# Patient Record
Sex: FEMALE | Race: ASIAN | Employment: PART TIME | URBAN - METROPOLITAN AREA
[De-identification: names, ages, dates, MRNs, and addresses within clinical notes are randomized per-mention and may not be internally consistent; named-entity substitution may affect disease eponyms.]

---

## 2017-12-25 ENCOUNTER — APPOINTMENT (OUTPATIENT)
Dept: CT IMAGING | Age: 27
DRG: 253 | End: 2017-12-25
Payer: COMMERCIAL

## 2017-12-25 ENCOUNTER — HOSPITAL ENCOUNTER (INPATIENT)
Age: 27
LOS: 2 days | Discharge: HOME | DRG: 253 | End: 2017-12-27
Attending: EMERGENCY MEDICINE | Admitting: INTERNAL MEDICINE
Payer: COMMERCIAL

## 2017-12-25 ENCOUNTER — APPOINTMENT (OUTPATIENT)
Dept: GENERAL RADIOLOGY | Age: 27
DRG: 253 | End: 2017-12-25
Payer: COMMERCIAL

## 2017-12-25 ENCOUNTER — HOSPITAL ENCOUNTER (EMERGENCY)
Age: 27
Discharge: HOME | DRG: 253 | End: 2017-12-25
Payer: COMMERCIAL

## 2017-12-25 VITALS
TEMPERATURE: 98 F | BODY MASS INDEX: 16.53 KG/M2 | SYSTOLIC BLOOD PRESSURE: 116 MMHG | RESPIRATION RATE: 20 BRPM | HEIGHT: 59 IN | WEIGHT: 82 LBS | HEART RATE: 101 BPM | DIASTOLIC BLOOD PRESSURE: 98 MMHG | OXYGEN SATURATION: 100 %

## 2017-12-25 DIAGNOSIS — K92.1 GASTROINTESTINAL HEMORRHAGE WITH MELENA: Primary | ICD-10-CM

## 2017-12-25 DIAGNOSIS — K29.01 ACUTE SUPERFICIAL GASTRITIS WITH HEMORRHAGE: Primary | ICD-10-CM

## 2017-12-25 DIAGNOSIS — S09.90XA CLOSED HEAD INJURY, INITIAL ENCOUNTER: ICD-10-CM

## 2017-12-25 DIAGNOSIS — R55 SYNCOPE AND COLLAPSE: ICD-10-CM

## 2017-12-25 PROBLEM — K92.2 UPPER GI BLEED: Status: ACTIVE | Noted: 2017-12-25

## 2017-12-25 LAB
ABO/RH: NORMAL
ALBUMIN SERPL-MCNC: 3.6 G/DL (ref 3.9–4.9)
ALBUMIN SERPL-MCNC: 3.9 G/DL (ref 3.9–4.9)
ALP BLD-CCNC: 34 U/L (ref 40–130)
ALP BLD-CCNC: 36 U/L (ref 40–130)
ALT SERPL-CCNC: 16 U/L (ref 0–33)
ALT SERPL-CCNC: 17 U/L (ref 0–33)
ANION GAP SERPL CALCULATED.3IONS-SCNC: 12 MEQ/L (ref 7–13)
ANION GAP SERPL CALCULATED.3IONS-SCNC: 12 MEQ/L (ref 7–13)
ANTIBODY SCREEN: NORMAL
AST SERPL-CCNC: 21 U/L (ref 0–35)
AST SERPL-CCNC: 26 U/L (ref 0–35)
BACTERIA: ABNORMAL /HPF
BASOPHILS ABSOLUTE: 0 K/UL (ref 0–0.2)
BASOPHILS ABSOLUTE: 0.1 K/UL (ref 0–0.2)
BASOPHILS ABSOLUTE: 0.1 K/UL (ref 0–0.2)
BASOPHILS RELATIVE PERCENT: 0.5 %
BASOPHILS RELATIVE PERCENT: 0.6 %
BASOPHILS RELATIVE PERCENT: 1.1 %
BILIRUB SERPL-MCNC: 0.4 MG/DL (ref 0–1.2)
BILIRUB SERPL-MCNC: 0.5 MG/DL (ref 0–1.2)
BILIRUBIN URINE: NEGATIVE
BLOOD, URINE: NEGATIVE
BUN BLDV-MCNC: 21 MG/DL (ref 6–20)
BUN BLDV-MCNC: 29 MG/DL (ref 6–20)
CALCIUM SERPL-MCNC: 7.6 MG/DL (ref 8.6–10.2)
CALCIUM SERPL-MCNC: 7.8 MG/DL (ref 8.6–10.2)
CHLORIDE BLD-SCNC: 104 MEQ/L (ref 98–107)
CHLORIDE BLD-SCNC: 106 MEQ/L (ref 98–107)
CHP ED QC CHECK: NORMAL
CLARITY: CLEAR
CO2: 20 MEQ/L (ref 22–29)
CO2: 21 MEQ/L (ref 22–29)
COLOR: YELLOW
CREAT SERPL-MCNC: 0.39 MG/DL (ref 0.5–0.9)
CREAT SERPL-MCNC: 0.42 MG/DL (ref 0.5–0.9)
EKG ATRIAL RATE: 71 BPM
EKG P AXIS: 76 DEGREES
EKG P-R INTERVAL: 158 MS
EKG Q-T INTERVAL: 400 MS
EKG QRS DURATION: 78 MS
EKG QTC CALCULATION (BAZETT): 434 MS
EKG R AXIS: 15 DEGREES
EKG T AXIS: 10 DEGREES
EKG VENTRICULAR RATE: 71 BPM
EOSINOPHILS ABSOLUTE: 0 K/UL (ref 0–0.7)
EOSINOPHILS ABSOLUTE: 0.1 K/UL (ref 0–0.7)
EOSINOPHILS ABSOLUTE: 0.3 K/UL (ref 0–0.7)
EOSINOPHILS RELATIVE PERCENT: 0.1 %
EOSINOPHILS RELATIVE PERCENT: 0.7 %
EOSINOPHILS RELATIVE PERCENT: 2.3 %
EPITHELIAL CELLS, UA: ABNORMAL /HPF
ETHANOL PERCENT: NORMAL G/DL
ETHANOL: <10 MG/DL (ref 0–0.08)
GFR AFRICAN AMERICAN: >60
GFR AFRICAN AMERICAN: >60
GFR NON-AFRICAN AMERICAN: >60
GFR NON-AFRICAN AMERICAN: >60
GLOBULIN: 2.2 G/DL (ref 2.3–3.5)
GLOBULIN: 2.3 G/DL (ref 2.3–3.5)
GLUCOSE BLD-MCNC: 123 MG/DL (ref 74–109)
GLUCOSE BLD-MCNC: 127 MG/DL (ref 74–109)
GLUCOSE URINE: NEGATIVE MG/DL
HCT VFR BLD CALC: 24 % (ref 37–47)
HCT VFR BLD CALC: 27.7 % (ref 37–47)
HCT VFR BLD CALC: 28.9 % (ref 37–47)
HEMOGLOBIN: 8 G/DL (ref 12–16)
HEMOGLOBIN: 9 G/DL (ref 12–16)
HEMOGLOBIN: 9.6 G/DL (ref 12–16)
KETONES, URINE: ABNORMAL MG/DL
LEUKOCYTE ESTERASE, URINE: ABNORMAL
LIPASE: 32 U/L (ref 13–60)
LYMPHOCYTES ABSOLUTE: 1.4 K/UL (ref 1–4.8)
LYMPHOCYTES ABSOLUTE: 1.6 K/UL (ref 1–4.8)
LYMPHOCYTES ABSOLUTE: 2.7 K/UL (ref 1–4.8)
LYMPHOCYTES RELATIVE PERCENT: 10.4 %
LYMPHOCYTES RELATIVE PERCENT: 17.3 %
LYMPHOCYTES RELATIVE PERCENT: 23.3 %
MCH RBC QN AUTO: 25.6 PG (ref 27–31.3)
MCH RBC QN AUTO: 26.2 PG (ref 27–31.3)
MCH RBC QN AUTO: 26.5 PG (ref 27–31.3)
MCHC RBC AUTO-ENTMCNC: 32.5 % (ref 33–37)
MCHC RBC AUTO-ENTMCNC: 33.2 % (ref 33–37)
MCHC RBC AUTO-ENTMCNC: 33.4 % (ref 33–37)
MCV RBC AUTO: 78.7 FL (ref 82–100)
MCV RBC AUTO: 78.8 FL (ref 82–100)
MCV RBC AUTO: 79.4 FL (ref 82–100)
MONOCYTES ABSOLUTE: 0.4 K/UL (ref 0.2–0.8)
MONOCYTES ABSOLUTE: 0.6 K/UL (ref 0.2–0.8)
MONOCYTES ABSOLUTE: 1 K/UL (ref 0.2–0.8)
MONOCYTES RELATIVE PERCENT: 2.8 %
MONOCYTES RELATIVE PERCENT: 7.3 %
MONOCYTES RELATIVE PERCENT: 8.4 %
NEUTROPHILS ABSOLUTE: 12.9 K/UL (ref 1.4–6.5)
NEUTROPHILS ABSOLUTE: 6 K/UL (ref 1.4–6.5)
NEUTROPHILS ABSOLUTE: 7.6 K/UL (ref 1.4–6.5)
NEUTROPHILS RELATIVE PERCENT: 64.9 %
NEUTROPHILS RELATIVE PERCENT: 74.1 %
NEUTROPHILS RELATIVE PERCENT: 86.2 %
NITRITE, URINE: NEGATIVE
PDW BLD-RTO: 13 % (ref 11.5–14.5)
PDW BLD-RTO: 13.3 % (ref 11.5–14.5)
PDW BLD-RTO: 13.3 % (ref 11.5–14.5)
PH UA: 7.5 (ref 5–9)
PLATELET # BLD: 150 K/UL (ref 130–400)
PLATELET # BLD: 184 K/UL (ref 130–400)
PLATELET # BLD: 202 K/UL (ref 130–400)
POC CREATININE WHOLE BLOOD: 1.1
POTASSIUM SERPL-SCNC: 3.6 MEQ/L (ref 3.5–5.1)
POTASSIUM SERPL-SCNC: 3.8 MEQ/L (ref 3.5–5.1)
PREGNANCY TEST URINE, POC: NEGATIVE
PROTEIN UA: 30 MG/DL
RBC # BLD: 3.02 M/UL (ref 4.2–5.4)
RBC # BLD: 3.51 M/UL (ref 4.2–5.4)
RBC # BLD: 3.67 M/UL (ref 4.2–5.4)
RBC UA: ABNORMAL /HPF (ref 0–2)
SODIUM BLD-SCNC: 137 MEQ/L (ref 132–144)
SODIUM BLD-SCNC: 138 MEQ/L (ref 132–144)
SPECIFIC GRAVITY UA: 1.02 (ref 1–1.03)
TOTAL PROTEIN: 5.8 G/DL (ref 6.4–8.1)
TOTAL PROTEIN: 6.2 G/DL (ref 6.4–8.1)
TROPONIN: <0.01 NG/ML (ref 0–0.01)
URINE REFLEX TO CULTURE: YES
UROBILINOGEN, URINE: 1 E.U./DL
WBC # BLD: 11.7 K/UL (ref 4.8–10.8)
WBC # BLD: 15 K/UL (ref 4.8–10.8)
WBC # BLD: 8.1 K/UL (ref 4.8–10.8)
WBC UA: ABNORMAL /HPF (ref 0–5)

## 2017-12-25 PROCEDURE — 99285 EMERGENCY DEPT VISIT HI MDM: CPT

## 2017-12-25 PROCEDURE — 99284 EMERGENCY DEPT VISIT MOD MDM: CPT

## 2017-12-25 PROCEDURE — 85025 COMPLETE CBC W/AUTO DIFF WBC: CPT

## 2017-12-25 PROCEDURE — 2580000003 HC RX 258: Performed by: PERSONAL EMERGENCY RESPONSE ATTENDANT

## 2017-12-25 PROCEDURE — 96372 THER/PROPH/DIAG INJ SC/IM: CPT

## 2017-12-25 PROCEDURE — 96374 THER/PROPH/DIAG INJ IV PUSH: CPT

## 2017-12-25 PROCEDURE — 80053 COMPREHEN METABOLIC PANEL: CPT

## 2017-12-25 PROCEDURE — C9113 INJ PANTOPRAZOLE SODIUM, VIA: HCPCS | Performed by: PERSONAL EMERGENCY RESPONSE ATTENDANT

## 2017-12-25 PROCEDURE — 84484 ASSAY OF TROPONIN QUANT: CPT

## 2017-12-25 PROCEDURE — 96361 HYDRATE IV INFUSION ADD-ON: CPT

## 2017-12-25 PROCEDURE — 86920 COMPATIBILITY TEST SPIN: CPT

## 2017-12-25 PROCEDURE — 96375 TX/PRO/DX INJ NEW DRUG ADDON: CPT

## 2017-12-25 PROCEDURE — 2580000003 HC RX 258: Performed by: EMERGENCY MEDICINE

## 2017-12-25 PROCEDURE — C9113 INJ PANTOPRAZOLE SODIUM, VIA: HCPCS | Performed by: INTERNAL MEDICINE

## 2017-12-25 PROCEDURE — 86900 BLOOD TYPING SEROLOGIC ABO: CPT

## 2017-12-25 PROCEDURE — 6360000002 HC RX W HCPCS: Performed by: INTERNAL MEDICINE

## 2017-12-25 PROCEDURE — 83690 ASSAY OF LIPASE: CPT

## 2017-12-25 PROCEDURE — G0480 DRUG TEST DEF 1-7 CLASSES: HCPCS

## 2017-12-25 PROCEDURE — 6360000004 HC RX CONTRAST MEDICATION: Performed by: PERSONAL EMERGENCY RESPONSE ATTENDANT

## 2017-12-25 PROCEDURE — 2580000003 HC RX 258: Performed by: INTERNAL MEDICINE

## 2017-12-25 PROCEDURE — 74177 CT ABD & PELVIS W/CONTRAST: CPT

## 2017-12-25 PROCEDURE — 6360000002 HC RX W HCPCS: Performed by: EMERGENCY MEDICINE

## 2017-12-25 PROCEDURE — 87086 URINE CULTURE/COLONY COUNT: CPT

## 2017-12-25 PROCEDURE — 93005 ELECTROCARDIOGRAM TRACING: CPT

## 2017-12-25 PROCEDURE — 36415 COLL VENOUS BLD VENIPUNCTURE: CPT

## 2017-12-25 PROCEDURE — 2000000000 HC ICU R&B

## 2017-12-25 PROCEDURE — 86850 RBC ANTIBODY SCREEN: CPT

## 2017-12-25 PROCEDURE — 86901 BLOOD TYPING SEROLOGIC RH(D): CPT

## 2017-12-25 PROCEDURE — 70450 CT HEAD/BRAIN W/O DYE: CPT

## 2017-12-25 PROCEDURE — 2580000003 HC RX 258: Performed by: PHYSICIAN ASSISTANT

## 2017-12-25 PROCEDURE — 71010 XR CHEST PORTABLE: CPT

## 2017-12-25 PROCEDURE — 12001 RPR S/N/AX/GEN/TRNK 2.5CM/<: CPT

## 2017-12-25 PROCEDURE — C9113 INJ PANTOPRAZOLE SODIUM, VIA: HCPCS | Performed by: EMERGENCY MEDICINE

## 2017-12-25 PROCEDURE — 6360000002 HC RX W HCPCS: Performed by: PERSONAL EMERGENCY RESPONSE ATTENDANT

## 2017-12-25 PROCEDURE — 81001 URINALYSIS AUTO W/SCOPE: CPT

## 2017-12-25 RX ORDER — FERROUS SULFATE 325(65) MG
325 TABLET ORAL
Qty: 30 TABLET | Refills: 0 | Status: SHIPPED | OUTPATIENT
Start: 2017-12-25

## 2017-12-25 RX ORDER — PROMETHAZINE HYDROCHLORIDE 25 MG/ML
25 INJECTION, SOLUTION INTRAMUSCULAR; INTRAVENOUS ONCE
Status: COMPLETED | OUTPATIENT
Start: 2017-12-25 | End: 2017-12-25

## 2017-12-25 RX ORDER — 0.9 % SODIUM CHLORIDE 0.9 %
10 VIAL (ML) INJECTION EVERY 12 HOURS SCHEDULED
Status: DISCONTINUED | OUTPATIENT
Start: 2017-12-25 | End: 2017-12-28 | Stop reason: HOSPADM

## 2017-12-25 RX ORDER — SODIUM CHLORIDE 9 MG/ML
INJECTION, SOLUTION INTRAVENOUS CONTINUOUS
Status: DISCONTINUED | OUTPATIENT
Start: 2017-12-25 | End: 2017-12-28 | Stop reason: HOSPADM

## 2017-12-25 RX ORDER — 0.9 % SODIUM CHLORIDE 0.9 %
1000 INTRAVENOUS SOLUTION INTRAVENOUS ONCE
Status: COMPLETED | OUTPATIENT
Start: 2017-12-25 | End: 2017-12-25

## 2017-12-25 RX ORDER — PANTOPRAZOLE SODIUM 40 MG/10ML
40 INJECTION, POWDER, LYOPHILIZED, FOR SOLUTION INTRAVENOUS ONCE
Status: COMPLETED | OUTPATIENT
Start: 2017-12-25 | End: 2017-12-25

## 2017-12-25 RX ORDER — SODIUM CHLORIDE 0.9 % (FLUSH) 0.9 %
10 SYRINGE (ML) INJECTION EVERY 12 HOURS SCHEDULED
Status: DISCONTINUED | OUTPATIENT
Start: 2017-12-25 | End: 2017-12-28 | Stop reason: HOSPADM

## 2017-12-25 RX ORDER — SODIUM CHLORIDE 9 MG/ML
INJECTION, SOLUTION INTRAVENOUS CONTINUOUS
Status: DISCONTINUED | OUTPATIENT
Start: 2017-12-25 | End: 2017-12-26

## 2017-12-25 RX ORDER — SODIUM CHLORIDE 0.9 % (FLUSH) 0.9 %
10 SYRINGE (ML) INJECTION PRN
Status: DISCONTINUED | OUTPATIENT
Start: 2017-12-25 | End: 2017-12-28 | Stop reason: HOSPADM

## 2017-12-25 RX ORDER — ACETAMINOPHEN 325 MG/1
650 TABLET ORAL EVERY 4 HOURS PRN
Status: DISCONTINUED | OUTPATIENT
Start: 2017-12-25 | End: 2017-12-28 | Stop reason: HOSPADM

## 2017-12-25 RX ORDER — ONDANSETRON 2 MG/ML
4 INJECTION INTRAMUSCULAR; INTRAVENOUS ONCE
Status: COMPLETED | OUTPATIENT
Start: 2017-12-25 | End: 2017-12-25

## 2017-12-25 RX ORDER — ONDANSETRON 4 MG/1
4 TABLET, ORALLY DISINTEGRATING ORAL EVERY 8 HOURS PRN
Qty: 20 TABLET | Refills: 0 | Status: SHIPPED | OUTPATIENT
Start: 2017-12-25 | End: 2018-01-08 | Stop reason: ALTCHOICE

## 2017-12-25 RX ORDER — 0.9 % SODIUM CHLORIDE 0.9 %
10 VIAL (ML) INJECTION PRN
Status: DISCONTINUED | OUTPATIENT
Start: 2017-12-25 | End: 2017-12-28 | Stop reason: HOSPADM

## 2017-12-25 RX ORDER — FAMOTIDINE 20 MG/1
20 TABLET, FILM COATED ORAL 2 TIMES DAILY
Qty: 60 TABLET | Refills: 0 | Status: ON HOLD | OUTPATIENT
Start: 2017-12-25 | End: 2017-12-28 | Stop reason: HOSPADM

## 2017-12-25 RX ORDER — 0.9 % SODIUM CHLORIDE 0.9 %
10 VIAL (ML) INJECTION DAILY
Status: DISCONTINUED | OUTPATIENT
Start: 2017-12-25 | End: 2017-12-25 | Stop reason: HOSPADM

## 2017-12-25 RX ORDER — ONDANSETRON 2 MG/ML
4 INJECTION INTRAMUSCULAR; INTRAVENOUS EVERY 6 HOURS PRN
Status: DISCONTINUED | OUTPATIENT
Start: 2017-12-25 | End: 2017-12-28 | Stop reason: HOSPADM

## 2017-12-25 RX ADMIN — Medication 10 ML: at 11:09

## 2017-12-25 RX ADMIN — PROMETHAZINE HYDROCHLORIDE 25 MG: 25 INJECTION, SOLUTION INTRAMUSCULAR; INTRAVENOUS at 04:42

## 2017-12-25 RX ADMIN — SODIUM CHLORIDE: 9 INJECTION, SOLUTION INTRAVENOUS at 20:32

## 2017-12-25 RX ADMIN — SODIUM CHLORIDE: 9 INJECTION, SOLUTION INTRAVENOUS at 11:08

## 2017-12-25 RX ADMIN — Medication 8 MG/HR: at 16:09

## 2017-12-25 RX ADMIN — IOPAMIDOL 100 ML: 612 INJECTION, SOLUTION INTRAVENOUS at 05:21

## 2017-12-25 RX ADMIN — SODIUM CHLORIDE 1000 ML: 9 INJECTION, SOLUTION INTRAVENOUS at 07:41

## 2017-12-25 RX ADMIN — Medication 8 MG/HR: at 08:00

## 2017-12-25 RX ADMIN — PANTOPRAZOLE SODIUM 40 MG: 40 INJECTION, POWDER, FOR SOLUTION INTRAVENOUS at 04:42

## 2017-12-25 RX ADMIN — SODIUM CHLORIDE 1000 ML: 9 INJECTION, SOLUTION INTRAVENOUS at 04:43

## 2017-12-25 RX ADMIN — ONDANSETRON 4 MG: 2 INJECTION INTRAMUSCULAR; INTRAVENOUS at 07:41

## 2017-12-25 ASSESSMENT — PAIN DESCRIPTION - PROGRESSION

## 2017-12-25 ASSESSMENT — PAIN SCALES - GENERAL
PAINLEVEL_OUTOF10: 0
PAINLEVEL_OUTOF10: 6
PAINLEVEL_OUTOF10: 0

## 2017-12-25 ASSESSMENT — PAIN DESCRIPTION - PAIN TYPE
TYPE: ACUTE PAIN
TYPE: ACUTE PAIN

## 2017-12-25 ASSESSMENT — ENCOUNTER SYMPTOMS
COUGH: 0
VOMITING: 1
EYE PAIN: 0
SHORTNESS OF BREATH: 0
CHEST TIGHTNESS: 0
DIARRHEA: 1
NAUSEA: 1
SHORTNESS OF BREATH: 0
COLOR CHANGE: 0
BLOOD IN STOOL: 1
BLOOD IN STOOL: 1
ABDOMINAL PAIN: 1
NAUSEA: 0
RHINORRHEA: 0
ABDOMINAL PAIN: 1
SORE THROAT: 0
VOMITING: 1

## 2017-12-25 ASSESSMENT — PAIN DESCRIPTION - ORIENTATION: ORIENTATION: POSTERIOR

## 2017-12-25 ASSESSMENT — PAIN SCALES - WONG BAKER
WONGBAKER_NUMERICALRESPONSE: 0
WONGBAKER_NUMERICALRESPONSE: 10

## 2017-12-25 ASSESSMENT — PAIN DESCRIPTION - LOCATION
LOCATION: ABDOMEN
LOCATION: HEAD

## 2017-12-25 NOTE — H&P
daily (with breakfast) 12/25/17   TERRY Soriano   famotidine (PEPCID) 20 MG tablet Take 1 tablet by mouth 2 times daily 12/25/17   TERRY Soriano   ondansetron (ZOFRAN ODT) 4 MG disintegrating tablet Take 1 tablet by mouth every 8 hours as needed for Nausea 12/25/17   TERRY Soriano       ALLERGIES: Review of patient's allergies indicates no known allergies. REVIEW OF SYSTEM:   ROS as noted in HPI, 12 point ROS reviewed and otherwise negative. OBJECTIVE  PHYSICAL EXAM: /79   Pulse 102   Temp 98.6 °F (37 °C) (Oral)   Resp 16   Ht 4' 11\" (1.499 m)   Wt 82 lb (37.2 kg)   LMP 12/10/2017 (Exact Date)   SpO2 100%   BMI 16.56 kg/m²     CONSTITUTIONAL:  alert, no apparent distress and thin  EYES:  pupils equal, round and reactive to light, conjunctiva normal sclera clear  ENT:  normocepalic, without obvious abnormality, atraumatic  NECK:  supple, symmetrical, trachea midline and skin normal  LUNGS:  no increased work of breathing and clear to auscultation  CARDIOVASCULAR:  normal apical pulses and normal S1 and S2  ABDOMEN:  normal bowel sounds and non-tender  MUSCULOSKELETAL:  there is no redness, warmth, or swelling of the joints  full range of motion noted  NEUROLOGIC:  Mental Status Exam:  Level of Alertness:   lethargic  Orientation:   person, place, time  SKIN:  normal skin color, texture, turgor    DATA:     Diagnostic tests reviewed for today's visit:    Most recent labs and imaging results reviewed.      LABS:    Recent Results (from the past 24 hour(s))   Comprehensive Metabolic Panel    Collection Time: 12/25/17  4:15 AM   Result Value Ref Range    Sodium 137 132 - 144 mEq/L    Potassium 3.6 3.5 - 5.1 mEq/L    Chloride 104 98 - 107 mEq/L    CO2 21 (L) 22 - 29 mEq/L    Anion Gap 12 7 - 13 mEq/L    Glucose 123 (H) 74 - 109 mg/dL    BUN 29 (H) 6 - 20 mg/dL    CREATININE 0.42 (L) 0.50 - 0.90 mg/dL    GFR Non-African American >60.0 >60    GFR  >60.0 >60    Calcium 7.8

## 2017-12-25 NOTE — ED PROVIDER NOTES
reviewed. No pertinent past medical history. SURGICAL HISTORY     History reviewed. No pertinent surgical history. CURRENT MEDICATIONS       Previous Medications    FAMOTIDINE (PEPCID) 20 MG TABLET    Take 1 tablet by mouth 2 times daily    FERROUS SULFATE (SAWYER-CURRY) 325 (65 FE) MG TABLET    Take 1 tablet by mouth daily (with breakfast)    ONDANSETRON (ZOFRAN ODT) 4 MG DISINTEGRATING TABLET    Take 1 tablet by mouth every 8 hours as needed for Nausea       ALLERGIES     Review of patient's allergies indicates no known allergies. FAMILY HISTORY     History reviewed. No pertinent family history. SOCIAL HISTORY       Social History     Social History    Marital status: Single     Spouse name: N/A    Number of children: N/A    Years of education: N/A     Social History Main Topics    Smoking status: Never Smoker    Smokeless tobacco: Never Used    Alcohol use No    Drug use: No    Sexual activity: Not Asked     Other Topics Concern    None     Social History Narrative    None       SCREENINGS             PHYSICAL EXAM    (up to 7 for level 4, 8 or more for level 5)     ED Triage Vitals [12/25/17 0732]   BP Temp Temp Source Pulse Resp SpO2 Height Weight   92/67 98.6 °F (37 °C) Oral 92 10 100 % 4' 11\" (1.499 m) 82 lb (37.2 kg)       Physical Exam   Constitutional: She is oriented to person, place, and time. She appears well-developed and well-nourished. She appears distressed. HENT:   Head: Normocephalic. Right Ear: External ear normal.   Left Ear: External ear normal.   Mouth/Throat: No oropharyngeal exudate. 2 cm vertical laceration to the occipital area with active bleeding   Eyes: Conjunctivae are normal. Pupils are equal, round, and reactive to light. Neck: Normal range of motion. Neck supple. No JVD present. No tracheal deviation present. No thyromegaly present. Cardiovascular: Normal rate and normal heart sounds. No murmur heard.   Pulmonary/Chest: Effort normal and breath negative for bleeding but she did have a scalp laceration that required staples. Patient was put on Protonix infusion since she already received Protonix bolus on her earlier visit last night. Patient will be admitted to intensive care. I contacted Dr Nathan Chao from GI to let him know that if she deteriorated, she would need endoscopy. MDM      REASSESSMENT     ED Course          CRITICAL CARE TIME   Total Critical Care time was 45 minutes, excluding separately reportable procedures. There was a high probability of clinically significant/life threatening deterioration in the patient's condition which required my urgent intervention. CONSULTS:  None    PROCEDURES:  Unless otherwise noted below, none     Procedures    FINAL IMPRESSION      1. Gastrointestinal hemorrhage with melena    2. Syncope and collapse    3. Closed head injury, initial encounter          DISPOSITION/PLAN   DISPOSITION  admit to ICU      PATIENT REFERRED TO:  No follow-up provider specified.     DISCHARGE MEDICATIONS:  New Prescriptions    No medications on file          (Please note that portions of this note were completed with a voice recognition program.  Efforts were made to edit the dictations but occasionally words are mis-transcribed.)    Lacey Singletary DO (electronically signed)  Attending Emergency Physician          Lacey Singletary DO  12/25/17 5977

## 2017-12-25 NOTE — ED NOTES
Small amount of blood noted to pillow. Small lac noted to back of head. Cleansed with NS. Dr Santos Neville to bedside to evaluate. Minh frank.       Blaine Parham RN  12/25/17 5721

## 2017-12-25 NOTE — CARE COORDINATION
Pt in ICU after a syncopal episode at home that she could not be aroused from, fell and sustained a LAC on her occiput. Pt had been to the ER earlier in the day w abd pain & dark stools, diagnosed w gastritis, meds prescribed, and dc'd home. I met w pt's BF, whom she lives w, and explained C3 role in pt care. Pt was asleep. He stated she is independent, healthy, and \"eats all the time\", she weights 82#. Awaiting GI assessment. C3 will follow. Dc needs pending outcome.

## 2017-12-26 ENCOUNTER — ANESTHESIA EVENT (OUTPATIENT)
Dept: OPERATING ROOM | Age: 27
DRG: 253 | End: 2017-12-26
Payer: COMMERCIAL

## 2017-12-26 ENCOUNTER — ANESTHESIA (OUTPATIENT)
Dept: OPERATING ROOM | Age: 27
DRG: 253 | End: 2017-12-26
Payer: COMMERCIAL

## 2017-12-26 VITALS
RESPIRATION RATE: 24 BRPM | SYSTOLIC BLOOD PRESSURE: 118 MMHG | DIASTOLIC BLOOD PRESSURE: 75 MMHG | OXYGEN SATURATION: 100 %

## 2017-12-26 LAB
ALBUMIN SERPL-MCNC: 3.5 G/DL (ref 3.9–4.9)
ALP BLD-CCNC: 38 U/L (ref 40–130)
ALT SERPL-CCNC: 16 U/L (ref 0–33)
ANION GAP SERPL CALCULATED.3IONS-SCNC: 18 MEQ/L (ref 7–13)
AST SERPL-CCNC: 30 U/L (ref 0–35)
BILIRUB SERPL-MCNC: 4.8 MG/DL (ref 0–1.2)
BLOOD BANK DISPENSE STATUS: NORMAL
BLOOD BANK DISPENSE STATUS: NORMAL
BLOOD BANK PRODUCT CODE: NORMAL
BLOOD BANK PRODUCT CODE: NORMAL
BPU ID: NORMAL
BPU ID: NORMAL
BUN BLDV-MCNC: 10 MG/DL (ref 6–20)
CALCIUM SERPL-MCNC: 7.9 MG/DL (ref 8.6–10.2)
CHLORIDE BLD-SCNC: 109 MEQ/L (ref 98–107)
CO2: 14 MEQ/L (ref 22–29)
CREAT SERPL-MCNC: 0.24 MG/DL (ref 0.5–0.9)
DESCRIPTION BLOOD BANK: NORMAL
DESCRIPTION BLOOD BANK: NORMAL
GFR AFRICAN AMERICAN: >60
GFR AFRICAN AMERICAN: >60
GFR NON-AFRICAN AMERICAN: 59
GFR NON-AFRICAN AMERICAN: >60
GLOBULIN: 2.4 G/DL (ref 2.3–3.5)
GLUCOSE BLD-MCNC: 51 MG/DL (ref 74–109)
HCT VFR BLD CALC: 22.4 % (ref 37–47)
HCT VFR BLD CALC: 36.8 % (ref 37–47)
HEMOGLOBIN: 12.3 G/DL (ref 12–16)
HEMOGLOBIN: 7.2 G/DL (ref 12–16)
PERFORMED ON: ABNORMAL
POC CREATININE: 1.1 MG/DL (ref 0.6–1.1)
POC SAMPLE TYPE: ABNORMAL
POTASSIUM SERPL-SCNC: 3.8 MEQ/L (ref 3.5–5.1)
SODIUM BLD-SCNC: 141 MEQ/L (ref 132–144)
TOTAL PROTEIN: 5.9 G/DL (ref 6.4–8.1)
URINE CULTURE, ROUTINE: NORMAL

## 2017-12-26 PROCEDURE — 2580000003 HC RX 258: Performed by: INTERNAL MEDICINE

## 2017-12-26 PROCEDURE — 3700000001 HC ADD 15 MINUTES (ANESTHESIA): Performed by: SPECIALIST

## 2017-12-26 PROCEDURE — 88342 IMHCHEM/IMCYTCHM 1ST ANTB: CPT

## 2017-12-26 PROCEDURE — 3E0G8GC INTRODUCTION OF OTHER THERAPEUTIC SUBSTANCE INTO UPPER GI, VIA NATURAL OR ARTIFICIAL OPENING ENDOSCOPIC: ICD-10-PCS | Performed by: SPECIALIST

## 2017-12-26 PROCEDURE — 85018 HEMOGLOBIN: CPT

## 2017-12-26 PROCEDURE — 2580000003 HC RX 258: Performed by: NURSE ANESTHETIST, CERTIFIED REGISTERED

## 2017-12-26 PROCEDURE — 88305 TISSUE EXAM BY PATHOLOGIST: CPT

## 2017-12-26 PROCEDURE — C9113 INJ PANTOPRAZOLE SODIUM, VIA: HCPCS | Performed by: INTERNAL MEDICINE

## 2017-12-26 PROCEDURE — 87077 CULTURE AEROBIC IDENTIFY: CPT

## 2017-12-26 PROCEDURE — 1210000000 HC MED SURG R&B

## 2017-12-26 PROCEDURE — 36430 TRANSFUSION BLD/BLD COMPNT: CPT

## 2017-12-26 PROCEDURE — 6360000002 HC RX W HCPCS: Performed by: INTERNAL MEDICINE

## 2017-12-26 PROCEDURE — 85014 HEMATOCRIT: CPT

## 2017-12-26 PROCEDURE — 0W3P8ZZ CONTROL BLEEDING IN GASTROINTESTINAL TRACT, VIA NATURAL OR ARTIFICIAL OPENING ENDOSCOPIC: ICD-10-PCS | Performed by: SPECIALIST

## 2017-12-26 PROCEDURE — 93010 ELECTROCARDIOGRAM REPORT: CPT | Performed by: INTERNAL MEDICINE

## 2017-12-26 PROCEDURE — 3609017100 HC EGD: Performed by: SPECIALIST

## 2017-12-26 PROCEDURE — 80053 COMPREHEN METABOLIC PANEL: CPT

## 2017-12-26 PROCEDURE — P9016 RBC LEUKOCYTES REDUCED: HCPCS

## 2017-12-26 PROCEDURE — 0DB68ZX EXCISION OF STOMACH, VIA NATURAL OR ARTIFICIAL OPENING ENDOSCOPIC, DIAGNOSTIC: ICD-10-PCS | Performed by: SPECIALIST

## 2017-12-26 PROCEDURE — 3700000000 HC ANESTHESIA ATTENDED CARE: Performed by: SPECIALIST

## 2017-12-26 PROCEDURE — 36415 COLL VENOUS BLD VENIPUNCTURE: CPT

## 2017-12-26 PROCEDURE — 2580000003 HC RX 258: Performed by: SPECIALIST

## 2017-12-26 PROCEDURE — 6360000002 HC RX W HCPCS: Performed by: NURSE ANESTHETIST, CERTIFIED REGISTERED

## 2017-12-26 RX ORDER — PROPOFOL 10 MG/ML
INJECTION, EMULSION INTRAVENOUS PRN
Status: DISCONTINUED | OUTPATIENT
Start: 2017-12-26 | End: 2017-12-26 | Stop reason: SDUPTHER

## 2017-12-26 RX ORDER — SODIUM CHLORIDE 9 MG/ML
INJECTION, SOLUTION INTRAVENOUS ONCE
Status: COMPLETED | OUTPATIENT
Start: 2017-12-26 | End: 2017-12-26

## 2017-12-26 RX ORDER — 0.9 % SODIUM CHLORIDE 0.9 %
250 INTRAVENOUS SOLUTION INTRAVENOUS ONCE
Status: COMPLETED | OUTPATIENT
Start: 2017-12-26 | End: 2017-12-26

## 2017-12-26 RX ORDER — FENTANYL CITRATE 50 UG/ML
50 INJECTION, SOLUTION INTRAMUSCULAR; INTRAVENOUS EVERY 10 MIN PRN
Status: DISCONTINUED | OUTPATIENT
Start: 2017-12-26 | End: 2017-12-26 | Stop reason: HOSPADM

## 2017-12-26 RX ORDER — MAGNESIUM HYDROXIDE 1200 MG/15ML
LIQUID ORAL PRN
Status: DISCONTINUED | OUTPATIENT
Start: 2017-12-26 | End: 2017-12-26 | Stop reason: HOSPADM

## 2017-12-26 RX ORDER — MORPHINE SULFATE 2 MG/ML
2 INJECTION, SOLUTION INTRAMUSCULAR; INTRAVENOUS EVERY 4 HOURS PRN
Status: DISCONTINUED | OUTPATIENT
Start: 2017-12-26 | End: 2017-12-28 | Stop reason: HOSPADM

## 2017-12-26 RX ORDER — OXYCODONE HYDROCHLORIDE AND ACETAMINOPHEN 5; 325 MG/1; MG/1
1 TABLET ORAL EVERY 6 HOURS PRN
Status: DISCONTINUED | OUTPATIENT
Start: 2017-12-26 | End: 2017-12-28 | Stop reason: HOSPADM

## 2017-12-26 RX ORDER — ONDANSETRON 2 MG/ML
4 INJECTION INTRAMUSCULAR; INTRAVENOUS
Status: DISCONTINUED | OUTPATIENT
Start: 2017-12-26 | End: 2017-12-26 | Stop reason: HOSPADM

## 2017-12-26 RX ORDER — METOCLOPRAMIDE HYDROCHLORIDE 5 MG/ML
10 INJECTION INTRAMUSCULAR; INTRAVENOUS
Status: DISCONTINUED | OUTPATIENT
Start: 2017-12-26 | End: 2017-12-26 | Stop reason: HOSPADM

## 2017-12-26 RX ORDER — 0.9 % SODIUM CHLORIDE 0.9 %
VIAL (ML) INJECTION PRN
Status: DISCONTINUED | OUTPATIENT
Start: 2017-12-26 | End: 2017-12-26 | Stop reason: HOSPADM

## 2017-12-26 RX ORDER — SODIUM CHLORIDE 9 MG/ML
INJECTION, SOLUTION INTRAVENOUS CONTINUOUS PRN
Status: DISCONTINUED | OUTPATIENT
Start: 2017-12-26 | End: 2017-12-26 | Stop reason: SDUPTHER

## 2017-12-26 RX ADMIN — SODIUM CHLORIDE: 9 INJECTION, SOLUTION INTRAVENOUS at 01:23

## 2017-12-26 RX ADMIN — Medication 2 MG: at 14:52

## 2017-12-26 RX ADMIN — PROPOFOL 50 MG: 10 INJECTION, EMULSION INTRAVENOUS at 13:42

## 2017-12-26 RX ADMIN — Medication 8 MG/HR: at 23:54

## 2017-12-26 RX ADMIN — SODIUM CHLORIDE 150 ML/HR: 900 INJECTION, SOLUTION INTRAVENOUS at 23:55

## 2017-12-26 RX ADMIN — PROPOFOL 50 MG: 10 INJECTION, EMULSION INTRAVENOUS at 13:44

## 2017-12-26 RX ADMIN — PROPOFOL 50 MG: 10 INJECTION, EMULSION INTRAVENOUS at 13:43

## 2017-12-26 RX ADMIN — SODIUM CHLORIDE: 900 INJECTION, SOLUTION INTRAVENOUS at 12:33

## 2017-12-26 RX ADMIN — SODIUM CHLORIDE 250 ML: 9 INJECTION, SOLUTION INTRAVENOUS at 04:28

## 2017-12-26 RX ADMIN — SODIUM CHLORIDE: 900 INJECTION, SOLUTION INTRAVENOUS at 14:51

## 2017-12-26 RX ADMIN — SODIUM CHLORIDE 250 ML: 9 INJECTION, SOLUTION INTRAVENOUS at 08:15

## 2017-12-26 RX ADMIN — Medication 8 MG/HR: at 01:38

## 2017-12-26 ASSESSMENT — PAIN SCALES - WONG BAKER
WONGBAKER_NUMERICALRESPONSE: 0

## 2017-12-26 ASSESSMENT — PAIN DESCRIPTION - PROGRESSION
CLINICAL_PROGRESSION: NOT CHANGED

## 2017-12-26 ASSESSMENT — PULMONARY FUNCTION TESTS
PIF_VALUE: 0
PIF_VALUE: 0
PIF_VALUE: 1
PIF_VALUE: 0
PIF_VALUE: 1
PIF_VALUE: 0
PIF_VALUE: 1
PIF_VALUE: 0
PIF_VALUE: 1
PIF_VALUE: 0

## 2017-12-26 ASSESSMENT — PAIN SCALES - GENERAL
PAINLEVEL_OUTOF10: 0
PAINLEVEL_OUTOF10: 8
PAINLEVEL_OUTOF10: 0
PAINLEVEL_OUTOF10: 0

## 2017-12-26 NOTE — PROGRESS NOTES
Seen in ICU rounds this morning. Admitted with acute GI leading. Workup per gastroenterology.   Continue to observe in ICU for now

## 2017-12-26 NOTE — FLOWSHEET NOTE
Spoke with dr Marva Gr regarding patient hgb, and low bp, orders to bolus patient and redraw hemoglobin at 3 am.  Electronically signed by Zachary Burton RN on 12/26/2017 at 1:10 AM

## 2017-12-26 NOTE — ANESTHESIA PRE PROCEDURE
(If Applicable):   Lab Results   Component Value Date    PREGTESTUR negative 12/25/2017        ABGs: No results found for: PHART, PO2ART, SYT8NYV, KEI2YOL, BEART, Z9SSKEDY     Type & Screen (If Applicable):  No results found for: LABABO, LABRH    Anesthesia Evaluation    Airway: Mallampati: II  TM distance: >3 FB   Neck ROM: full  Mouth opening: > = 3 FB Dental: normal exam         Pulmonary:Negative Pulmonary ROS breath sounds clear to auscultation                             Cardiovascular:Negative CV ROS            Rhythm: regular                      Neuro/Psych:   Negative Neuro/Psych ROS              GI/Hepatic/Renal:            ROS comment: GI Bleed . Endo/Other:    (+) blood dyscrasia: anemia:., .                  ROS comment: Received a unit of RBC yesterday Abdominal:           Vascular: negative vascular ROS. Anesthesia Plan      MAC     ASA 2       Induction: intravenous. MIPS: Prophylactic antiemetics administered. Anesthetic plan and risks discussed with patient. Use of blood products discussed with patient whom consented to blood products.    Plan discussed with CRNA and surgical team.                  Tommie Reis MD   12/26/2017

## 2017-12-26 NOTE — PROGRESS NOTES
Patient remains hypotensive despite NS bolus x2, most recent Hgb 7.1. Transfusing 1U RBC. Follow RBC after transfusion.

## 2017-12-26 NOTE — FLOWSHEET NOTE
Pt up to bathroom, pt aware of request to wash up and remove personal clothing items for EGD today, pt currently feeling weak.   Electronically signed by Ramon Delatorre RN on 12/26/2017 at 3:50 AM

## 2017-12-26 NOTE — FLOWSHEET NOTE
Dr Nancy Piña at bedside, decision to transfuse 1 unit of PRBC, will place order.   Electronically signed by Dipti Abel RN on 12/26/2017 at 3:45 AM

## 2017-12-27 LAB
CLOTEST: NEGATIVE
HCT VFR BLD CALC: 34 % (ref 37–47)
HCT VFR BLD CALC: 35.2 % (ref 37–47)
HEMOGLOBIN: 11.2 G/DL (ref 12–16)
HEMOGLOBIN: 11.9 G/DL (ref 12–16)
MCH RBC QN AUTO: 26.9 PG (ref 27–31.3)
MCH RBC QN AUTO: 27.1 PG (ref 27–31.3)
MCHC RBC AUTO-ENTMCNC: 32.9 % (ref 33–37)
MCHC RBC AUTO-ENTMCNC: 33.8 % (ref 33–37)
MCV RBC AUTO: 80.3 FL (ref 82–100)
MCV RBC AUTO: 81.8 FL (ref 82–100)
PDW BLD-RTO: 14.3 % (ref 11.5–14.5)
PDW BLD-RTO: 14.4 % (ref 11.5–14.5)
PLATELET # BLD: 155 K/UL (ref 130–400)
PLATELET # BLD: 185 K/UL (ref 130–400)
RBC # BLD: 4.15 M/UL (ref 4.2–5.4)
RBC # BLD: 4.38 M/UL (ref 4.2–5.4)
WBC # BLD: 6.9 K/UL (ref 4.8–10.8)
WBC # BLD: 6.9 K/UL (ref 4.8–10.8)

## 2017-12-27 PROCEDURE — 85027 COMPLETE CBC AUTOMATED: CPT

## 2017-12-27 PROCEDURE — 2700000000 HC OXYGEN THERAPY PER DAY

## 2017-12-27 PROCEDURE — C9113 INJ PANTOPRAZOLE SODIUM, VIA: HCPCS | Performed by: INTERNAL MEDICINE

## 2017-12-27 PROCEDURE — 1210000000 HC MED SURG R&B

## 2017-12-27 PROCEDURE — 36415 COLL VENOUS BLD VENIPUNCTURE: CPT

## 2017-12-27 PROCEDURE — 2580000003 HC RX 258: Performed by: INTERNAL MEDICINE

## 2017-12-27 PROCEDURE — 6360000002 HC RX W HCPCS: Performed by: INTERNAL MEDICINE

## 2017-12-27 RX ORDER — SODIUM CHLORIDE 9 MG/ML
INJECTION, SOLUTION INTRAVENOUS ONCE
Status: COMPLETED | OUTPATIENT
Start: 2017-12-27 | End: 2017-12-27

## 2017-12-27 RX ADMIN — SODIUM CHLORIDE: 900 INJECTION, SOLUTION INTRAVENOUS at 14:04

## 2017-12-27 RX ADMIN — Medication 8 MG/HR: at 16:23

## 2017-12-27 RX ADMIN — SODIUM CHLORIDE: 900 INJECTION, SOLUTION INTRAVENOUS at 07:59

## 2017-12-27 RX ADMIN — SODIUM CHLORIDE: 9 INJECTION, SOLUTION INTRAVENOUS at 16:24

## 2017-12-27 ASSESSMENT — PAIN SCALES - GENERAL
PAINLEVEL_OUTOF10: 0
PAINLEVEL_OUTOF10: 0

## 2017-12-27 NOTE — OP NOTE
[address]                       [address], [address] [facility]                                 OPERATIVE REPORT    PATIENT NAME: Ayanna Buenrostro                           :        1990  MED REC NO:   [id number]                            ROOM:       H480  ACCOUNT NO:   [de-identified]                           ADMIT DATE: 2017  PROVIDER:     Wendy Barrera MD    DATE OF PROCEDURE:  2017    OPERATION PERFORMED:  EGD, biopsy, endoscopic hemostasis. SURGEON:  Wendy Barrera MD    INDICATIONS:  A 77-year-old [facility] female admitted with history of  hematemesis, passing dark stool, and anemia. The procedure was done under  monitored anesthesia    OPERATIVE PROCEDURE:  After induction of anesthesia, Olympus video  gastroscope was introduced into the esophagus under direct visualization  and advanced up to the distal duodenum. Esophageal mucosa was normal.  No  inflammation or ulcerations were seen. GE junction was at about 35 cm from  the incisors. Stomach - fundus, body, antrum, and pylorus, was examined. That showed slightly nodular mucosa involving the antrum and also involving  the gastric incisura. Biopsies were obtained from this area, and also a  SHWETA test was done. Duodenal bulb showed an ulcer measuring about 10 to 12  mm in size with, appeared to be like, a clean base, but no active bleeding  was seen; however, it was difficult to see if there was any visible vessel  in the ulcer crater. So, given the benefit of doubt, some dilute  epinephrine was injected into the ulcer crater and cauterized using the  heater probe with adequate coagulation of the tissue. The distal duodenum  appeared normal.  No bleeding was seen. POSTOPERATIVE DIAGNOSES:  Duodenal ulcer and gastritis.         Perico Pro MD    D: 2017 14:10:26       T: 2017 1:38:44     [email or web address]  Job#: [id number]     Doc#: [id number]    CC:  MD Estefania Burns

## 2017-12-27 NOTE — DISCHARGE SUMMARY
Hospital Medicine Discharge Summary    Minda Han  :  1990  MRN:  [id number]    Admit date:  2017  Discharge date:  2017    Admitting Physician:  Marichuy Ugalde DO  Primary Care Physician:  No primary care provider on file. Discharge Diagnoses: Active Problems:    Upper GI bleed    Chief Complaint   Patient presents with    Loss of Consciousness     pt passed out at home & hit her head on the sink per family, in ER earlier for n/v     Hospital Course:   Minda Han is a 32 y.o. female that was admitted and treated at [facility] for the following medical issues: Active Problems:    Upper GI bleed      Admitted for melanotic stools and syncope in the setting of acute drop in H/H. Had EGD showing clean based duodenal ulcer and gastritis, no bleeding vessels in DU but was injected with epi and cauterized anyway. She will need to see GI. Pt was discharge in a stable condition. Exam on discharge:   BP (!) 100/53   Pulse 83   Temp 98.4 °F (36.9 °C) (Oral)   Resp 20   Ht 4' 11\" (1.499 m)   Wt 82 lb (37.2 kg)   LMP 12/10/2017 (Exact Date)   SpO2 100%   BMI 16.56 kg/m²   General appearance: No apparent distress, appears stated age and cooperative. HEENT: Pupils equal, round, and reactive to light. Conjunctivae/corneas clear. Neck: Supple, with full range of motion. No jugular venous distention. Trachea midline. Respiratory:  Normal respiratory effort. Clear to auscultation, bilaterally without Rales/Wheezes/Rhonchi. Cardiovascular: Regular rate and rhythm with normal S1/S2 without murmurs, rubs or gallops. Abdomen: Soft, non-tender, non-distended with normal bowel sounds. Musculoskeletal: No clubbing, cyanosis or edema bilaterally. Full range of motion without deformity. Skin: Skin color, texture, turgor normal.  No rashes or lesions. Neuro: Non Focal. Symetrical motor and tone. Nl Comprehension, Alert,awake and oriented. NL CN.  Symetrical tone and reflexes. Psychiatric: Alert and oriented, thought content appropriate, normal insight  Capillary Refill: Brisk,< 3 seconds   Peripheral Pulses: +2 palpable, equal bilaterally     Patient was seen by the following consultants while admitted to [facility]:   Consults:  IP CONSULT TO HOSPITALIST  IP CONSULT TO GI    Significant Diagnostic Studies:    Ct Head Wo Contrast    Result Date: 12/25/2017  EXAMINATION: CT HEAD WO CONTRAST CLINICAL HISTORY: fall COMPARISON:  SYNCOPE. TRAUMA TO THE BACK OF THE HEAD. An unenhanced scan is performed. FINDINGS:   There is no bleed, mass effect, or space occupying lesion. No extra-axial mass or fluid collections. Calvarium and skull base intact. NO ACUTE PROCESS IN THE BRAIN. All CT scans at this facility use dose modulation, iterative reconstruction, and/or weight based dosing when appropriate to reduce radiation dose to as low as reasonably achievable. Ct Abdomen Pelvis W Iv Contrast Additional Contrast? None    Result Date: 12/25/2017  EXAM: CT ABDOMEN PELVIS W IV CONTRAST CLINICAL: hematemesis TECHNIQUE: CT scans of the abdomen and pelvis were obtained Following 100 cc of IV nonionic contrast material. All CT scans at this facility use dose modulation, iterative reconstruction, and/or weight based dosing when appropriate to reduce radiation dose to as low as reasonably achievable. FINDINGS: Lower lung fields are free of active disease. .       No focal lesion seen in the liver or spleen. No focal lesions or inflammatory changes in/around the pancreas or lesser sac. The stomach is not optimally distended but grossly within normal limits. There is prompt uptake and excretion by the kidneys. No evidence of obstructive uropathy. No retroperitoneal mass or fluid collection. Small bowel and appendix unremarkable. Large amount of stool throughout the colon noted. No evidence of colitis. No evidence of bowel obstruction.        Uterus

## 2017-12-28 VITALS
SYSTOLIC BLOOD PRESSURE: 103 MMHG | HEIGHT: 59 IN | HEART RATE: 78 BPM | BODY MASS INDEX: 16.53 KG/M2 | OXYGEN SATURATION: 100 % | WEIGHT: 82 LBS | RESPIRATION RATE: 18 BRPM | DIASTOLIC BLOOD PRESSURE: 66 MMHG | TEMPERATURE: 97.9 F

## 2017-12-28 LAB
HCT VFR BLD CALC: 37.8 % (ref 37–47)
HEMOGLOBIN: 12.5 G/DL (ref 12–16)

## 2017-12-28 PROCEDURE — 85018 HEMOGLOBIN: CPT

## 2017-12-28 PROCEDURE — 85014 HEMATOCRIT: CPT

## 2017-12-28 PROCEDURE — 36415 COLL VENOUS BLD VENIPUNCTURE: CPT

## 2017-12-28 PROCEDURE — 6360000002 HC RX W HCPCS: Performed by: INTERNAL MEDICINE

## 2017-12-28 PROCEDURE — 2580000003 HC RX 258: Performed by: INTERNAL MEDICINE

## 2017-12-28 PROCEDURE — C9113 INJ PANTOPRAZOLE SODIUM, VIA: HCPCS | Performed by: INTERNAL MEDICINE

## 2017-12-28 RX ORDER — PANTOPRAZOLE SODIUM 40 MG/1
40 TABLET, DELAYED RELEASE ORAL DAILY
Qty: 30 TABLET | Refills: 3 | Status: SHIPPED | OUTPATIENT
Start: 2017-12-28

## 2017-12-28 RX ADMIN — SODIUM CHLORIDE: 900 INJECTION, SOLUTION INTRAVENOUS at 01:23

## 2017-12-28 RX ADMIN — Medication 8 MG/HR: at 01:28

## 2017-12-28 NOTE — FLOWSHEET NOTE
Shift assessment completed. Pt denies pain, nausea, sob. Pt c/o slight dizziness. Pt has family at bedside. Pt has no needs at this time. Will continue to monitor.  Electronically signed by Lily Keith RN on 12/27/2017 at 9:41 PM

## 2017-12-28 NOTE — CARE COORDINATION
12/28/17 I MET WITH PT AND HER BF AND SHE PLANNED [address] [place] THIS AM TO HOME AND DENIES  [address] [place] NEEDS.

## 2018-01-08 PROBLEM — K27.4 PEPTIC ULCER DISEASE WITH HEMORRHAGE: Chronic | Status: ACTIVE | Noted: 2018-01-08

## 2024-06-01 NOTE — ED PROVIDER NOTES
Patient Clipped and Prepped: right groin. Prepped with ChloraPrep, a minimum of 3 minute dry time, longer if needed, no pooling noted, patient draped in sterile fashion and Betadine and draped in sterile fashion. Single     Spouse name: N/A    Number of children: N/A    Years of education: N/A     Social History Main Topics    Smoking status: Never Smoker    Smokeless tobacco: Never Used    Alcohol use No    Drug use: Unknown    Sexual activity: Not Asked     Other Topics Concern    None     Social History Narrative    None         PHYSICAL EXAM         ED Triage Vitals   BP Temp Temp src Pulse Resp SpO2 Height Weight   -- -- -- -- -- -- -- --       Physical Exam   Constitutional: She is oriented to person, place, and time. She appears well-developed and well-nourished. She appears distressed. Pt arrives in moderate distress, appears scared   HENT:   Head: Normocephalic and atraumatic. Mouth/Throat: Oropharynx is clear and moist.   Eyes: Conjunctivae and EOM are normal. Pupils are equal, round, and reactive to light. Neck: Normal range of motion. Neck supple. No tracheal deviation present. Cardiovascular: Normal heart sounds and intact distal pulses. Pulmonary/Chest: Effort normal and breath sounds normal. No stridor. No respiratory distress. Abdominal: Soft. Bowel sounds are normal. She exhibits no distension and no mass. There is tenderness. There is no rebound and no guarding. Mild tenderness to palpation epigastrically, no pulsatile mass or bruit, no ecchymosis, no rebound or rigidity   Musculoskeletal: Normal range of motion. Neurological: She is alert and oriented to person, place, and time. She has normal reflexes. Skin: Skin is warm and dry. No rash noted. Psychiatric: She has a normal mood and affect. Her behavior is normal. Judgment and thought content normal.       DIAGNOSTIC RESULTS     EKG: All EKG's are interpreted by the Emergency Department Physician who either signs or Co-signs this chart in the absence of a cardiologist.    EKG shows normal sinus rhythm, heart 71, normal intervals, normal axis, no ST segment changes. T-wave inversion in lead V3.      RADIOLOGY:   Non-plain film images such as CT, Ultrasound and MRI are read by the radiologist. Plain radiographic images are visualized and preliminarily interpreted by the emergency physician with the below findings:    Interpretation per the Radiologist below, if available at the time of this note:    XR Chest Portable    (Results Pending)   CT ABDOMEN PELVIS W IV CONTRAST Additional Contrast? None    (Results Pending)           LABS:  Labs Reviewed   COMPREHENSIVE METABOLIC PANEL - Abnormal; Notable for the following:        Result Value    CO2 21 (*)     Glucose 123 (*)     BUN 29 (*)     CREATININE 0.42 (*)     Calcium 7.8 (*)     Total Protein 6.2 (*)     Alkaline Phosphatase 36 (*)     All other components within normal limits   CBC WITH AUTO DIFFERENTIAL - Abnormal; Notable for the following:     WBC 11.7 (*)     RBC 3.67 (*)     Hemoglobin 9.6 (*)     Hematocrit 28.9 (*)     MCV 78.7 (*)     MCH 26.2 (*)     Neutrophils # 7.6 (*)     Monocytes # 1.0 (*)     All other components within normal limits   URINE RT REFLEX TO CULTURE - Abnormal; Notable for the following:     Ketones, Urine TRACE (*)     Protein, UA 30 (*)     Leukocyte Esterase, Urine SMALL (*)     All other components within normal limits   MICROSCOPIC URINALYSIS - Abnormal; Notable for the following:     WBC, UA 6-10 (*)     All other components within normal limits   POCT URINE PREGNANCY - Normal   POCT CREATININE - Normal   URINE CULTURE   LIPASE   TROPONIN       All other labs were within normal range or not returned as of this dictation. EMERGENCY DEPARTMENT COURSE and DIFFERENTIAL DIAGNOSIS/MDM:   Vitals:    Vitals:    12/25/17 0415   BP: 112/74   Pulse: 89   Resp: 20   Temp: 98 °F (36.7 °C)   TempSrc: Oral   SpO2: 100%   Weight: 82 lb (37.2 kg)   Height: 4' 11\" (1.499 m)         MDM    CT scan reveals gastritis, blood work reveals hemoglobin of 9.6. No previous hemoglobin for comparison. Vital signs are not concerning at this time.   Patient is laying

## 2024-10-29 ENCOUNTER — OFFICE VISIT (OUTPATIENT)
Dept: OBGYN CLINIC | Age: 34
End: 2024-10-29
Payer: COMMERCIAL

## 2024-10-29 VITALS
SYSTOLIC BLOOD PRESSURE: 100 MMHG | WEIGHT: 85 LBS | DIASTOLIC BLOOD PRESSURE: 60 MMHG | HEIGHT: 59 IN | BODY MASS INDEX: 17.14 KG/M2 | HEART RATE: 82 BPM

## 2024-10-29 DIAGNOSIS — N97.9 INFERTILITY, FEMALE: ICD-10-CM

## 2024-10-29 DIAGNOSIS — N91.2 AMENORRHEA: Primary | ICD-10-CM

## 2024-10-29 LAB
HCG, URINE, POC: NEGATIVE
Lab: NORMAL
NEGATIVE QC PASS/FAIL: NORMAL
POSITIVE QC PASS/FAIL: NORMAL

## 2024-10-29 PROCEDURE — 99204 OFFICE O/P NEW MOD 45 MIN: CPT | Performed by: OBSTETRICS & GYNECOLOGY

## 2024-10-29 PROCEDURE — G8484 FLU IMMUNIZE NO ADMIN: HCPCS | Performed by: OBSTETRICS & GYNECOLOGY

## 2024-10-29 PROCEDURE — 81025 URINE PREGNANCY TEST: CPT | Performed by: OBSTETRICS & GYNECOLOGY

## 2024-10-29 PROCEDURE — 4004F PT TOBACCO SCREEN RCVD TLK: CPT | Performed by: OBSTETRICS & GYNECOLOGY

## 2024-10-29 PROCEDURE — G8427 DOCREV CUR MEDS BY ELIG CLIN: HCPCS | Performed by: OBSTETRICS & GYNECOLOGY

## 2024-10-29 PROCEDURE — G8419 CALC BMI OUT NRM PARAM NOF/U: HCPCS | Performed by: OBSTETRICS & GYNECOLOGY

## 2024-10-29 RX ORDER — VITAMIN A, VITAMIN C, VITAMIN D-3, VITAMIN E, VITAMIN B-1, VITAMIN B-2, NIACIN, VITAMIN B-6, CALCIUM, IRON, ZINC, COPPER 4000; 120; 400; 22; 1.84; 3; 20; 10; 1; 12; 200; 27; 25; 2 [IU]/1; MG/1; [IU]/1; MG/1; MG/1; MG/1; MG/1; MG/1; MG/1; UG/1; MG/1; MG/1; MG/1; MG/1
1 TABLET ORAL DAILY
Qty: 90 TABLET | Refills: 3 | Status: CANCELLED | OUTPATIENT
Start: 2024-10-29

## 2024-10-29 RX ORDER — ALBUTEROL SULFATE 0.83 MG/ML
SOLUTION RESPIRATORY (INHALATION)
COMMUNITY

## 2024-10-29 RX ORDER — CARBOXYMETHYLCELLULOSE SODIUM 10 MG/ML
GEL OPHTHALMIC
COMMUNITY
Start: 2024-08-05

## 2024-10-29 SDOH — ECONOMIC STABILITY: FOOD INSECURITY: WITHIN THE PAST 12 MONTHS, YOU WORRIED THAT YOUR FOOD WOULD RUN OUT BEFORE YOU GOT MONEY TO BUY MORE.: NEVER TRUE

## 2024-10-29 SDOH — ECONOMIC STABILITY: INCOME INSECURITY: HOW HARD IS IT FOR YOU TO PAY FOR THE VERY BASICS LIKE FOOD, HOUSING, MEDICAL CARE, AND HEATING?: NOT HARD AT ALL

## 2024-10-29 SDOH — ECONOMIC STABILITY: FOOD INSECURITY: WITHIN THE PAST 12 MONTHS, THE FOOD YOU BOUGHT JUST DIDN'T LAST AND YOU DIDN'T HAVE MONEY TO GET MORE.: NEVER TRUE

## 2024-10-29 ASSESSMENT — PATIENT HEALTH QUESTIONNAIRE - PHQ9
SUM OF ALL RESPONSES TO PHQ QUESTIONS 1-9: 2
SUM OF ALL RESPONSES TO PHQ9 QUESTIONS 1 & 2: 2
SUM OF ALL RESPONSES TO PHQ QUESTIONS 1-9: 2
2. FEELING DOWN, DEPRESSED OR HOPELESS: SEVERAL DAYS
1. LITTLE INTEREST OR PLEASURE IN DOING THINGS: SEVERAL DAYS
SUM OF ALL RESPONSES TO PHQ QUESTIONS 1-9: 2
SUM OF ALL RESPONSES TO PHQ QUESTIONS 1-9: 2

## 2024-10-29 ASSESSMENT — ENCOUNTER SYMPTOMS
SHORTNESS OF BREATH: 0
CONSTIPATION: 0
BLOOD IN STOOL: 0
ABDOMINAL DISTENTION: 0
CHEST TIGHTNESS: 0
SORE THROAT: 0
VOMITING: 0
COUGH: 0
TROUBLE SWALLOWING: 0
ABDOMINAL PAIN: 0
BACK PAIN: 0
VOICE CHANGE: 0
NAUSEA: 0
COLOR CHANGE: 0
WHEEZING: 0

## 2024-10-29 NOTE — PROGRESS NOTES
HPI:  Flora Chi (: 1990) is a 34 y.o. female, New patient, here for evaluation of the following chief complaint(s):  Amenorrhea (Had a cycle 2 wks ago which was late for her. 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  She is a 34-year-old nulligravida female.  Her only complaint at the present time is that her menstrual flow is still irregular but it is shorter and lighter.  She states that she and her partner have been trying to achieve pregnancy for 10 years without success.  Her  works as a .  He may be exposed to some chemicals.  He does not smoke drink or use any illicit drugs.  The patient's health is excellent she has no medical issues.  She has not had any prior gynecologic surgery.  She denies painful menses she denies dyspareunia.  She has no history of pelvic inflammatory disease.      SUBJECTIVE/OBJECTIVE:    Past Surgical History:   Procedure Laterality Date    UPPER GASTROINTESTINAL ENDOSCOPY N/A 2017    EGD ESOPHAGOGASTRODUODENOSCOPY performed by Xavier Vazquez MD at [facility] OR        Review of Systems   Constitutional:  Negative for activity change, appetite change, fatigue and unexpected weight change.   HENT:  Negative for dental problem, ear pain, hearing loss, nosebleeds, sore throat, trouble swallowing and voice change.    Eyes:  Negative for visual disturbance.   Respiratory:  Negative for cough, chest tightness, shortness of breath and wheezing.    Cardiovascular:  Negative for chest pain and palpitations.   Gastrointestinal:  Negative for abdominal distention, abdominal pain, blood in stool, constipation, nausea and vomiting.   Endocrine: Negative for cold intolerance, heat intolerance, polydipsia, polyphagia and polyuria.   Genitourinary:  Negative for difficulty urinating, dyspareunia, dysuria, flank pain, frequency, genital sores, hematuria, menstrual problem, pelvic pain, urgency, vaginal bleeding,

## 2025-03-28 ENCOUNTER — PRE-PROCEDURE TELEPHONE (OUTPATIENT)
Dept: INTERVENTIONAL RADIOLOGY/VASCULAR | Age: 35
End: 2025-03-28

## 2025-03-28 NOTE — FLOWSHEET NOTE
Call to patient to confirm apt and the following reviewed:     >  YOUR PROCEDURE IS SCHEDULED ON: 3/31/25 arrive by 0830 to radiology desk for labwork      >  You will need to arrive by 0830 and check in at the Diagnostic Imaging Check In desk to have HCG lab drawn and then may leave and return at 0930 at the Diagnostic Imaging Check In desk for procedure.      >  Exam must be scheduled 7-10 days from the first day of menstrual cycle, pt states LMP 3/22/25     >  No sex from first day of menstrual cycle until 2 days after procedure.     >  If have any bleeding or spotting or unusual vaginal discharge the morning of the exam, pt must call to reschedule.     Pt confirms apt.Electronically signed by Gloria Marion RN on 3/28/2025 at 11:19 AM

## 2025-03-31 ENCOUNTER — HOSPITAL ENCOUNTER (OUTPATIENT)
Dept: INTERVENTIONAL RADIOLOGY/VASCULAR | Age: 35
Discharge: HOME | End: 2025-04-02
Payer: COMMERCIAL

## 2025-03-31 ENCOUNTER — RESULTS FOLLOW-UP (OUTPATIENT)
Dept: OBGYN CLINIC | Age: 35
End: 2025-03-31

## 2025-03-31 VITALS
OXYGEN SATURATION: 100 % | RESPIRATION RATE: 14 BRPM | HEART RATE: 77 BPM | WEIGHT: 90 LBS | BODY MASS INDEX: 18.14 KG/M2 | TEMPERATURE: 98.2 F | DIASTOLIC BLOOD PRESSURE: 71 MMHG | SYSTOLIC BLOOD PRESSURE: 133 MMHG | HEIGHT: 59 IN

## 2025-03-31 DIAGNOSIS — N97.9 INFERTILITY, FEMALE: ICD-10-CM

## 2025-03-31 DIAGNOSIS — N97.9 INFERTILITY, FEMALE: Primary | ICD-10-CM

## 2025-03-31 LAB — GONADOTROPIN, CHORIONIC (HCG) QUANT: <0.1 MIU/ML

## 2025-03-31 PROCEDURE — 84702 CHORIONIC GONADOTROPIN TEST: CPT

## 2025-03-31 PROCEDURE — 74740 X-RAY FEMALE GENITAL TRACT: CPT

## 2025-03-31 PROCEDURE — 58340 CATHETER FOR HYSTEROGRAPHY: CPT

## 2025-03-31 PROCEDURE — 6360000004 HC RX CONTRAST MEDICATION: Performed by: NURSE PRACTITIONER

## 2025-03-31 PROCEDURE — 2709999900 XR HYSTEROSALPINGOGRAPHY S&I

## 2025-03-31 RX ADMIN — IOHEXOL 10 ML: 300 INJECTION, SOLUTION INTRAVENOUS at 10:27

## 2025-03-31 ASSESSMENT — PAIN - FUNCTIONAL ASSESSMENT: PAIN_FUNCTIONAL_ASSESSMENT: NONE - DENIES PAIN

## 2025-03-31 NOTE — FLOWSHEET NOTE
Pt to ct holding for CHG lab work. Blood drawn and taken to lab. Pt notified to return at 0930 to the waiting room. Electronically signed by Gloria Marion RN on 3/31/2025 at 8:38 AM

## 2025-03-31 NOTE — OR NURSING
Procedure explained and consent obtained with verbalized understanding. Negative hcg test confirmed.     Pt assisted onto fluoro room table in supine position.    History, allergies (confirmed no contrast allergy) and medications reviewed.     1015 Dr Fletcher present speaking with patient. Procedure explained, including risks/benefits. Pt denies questions.     1017 Time-out performed.   Pt positioned into lithotomy position. Appropriate sized speculum inserted by Dr Fletcher/Sadia Romero CNP. Betadine used to clean cervix using sterile q tip swabs by Sadia Romero CNP.     1024 7 fr hysterosalpingogram catheter ([facility]) inserted thorough speculum    1027 Omnipaque 300 contrast instilled through catheter using fluoro guidance.     Dr. Fletcher speaking to patient, states both fallopian tubes are open.    All fluoro images obtained per Dr Fletcher.     Verbal support given to the patient throughout the procedure.     1031 Pt assisted off of table and to the restroom for post procedure void.     1034 Pt assisted back onto xray table for final fluoro image per Dr Fletcher.     Confirmed pt not experiencing any cramping or pain at this time. Pt did report light vaginal bleeding.     Pt back to CT holding via wheelchair.

## 2025-03-31 NOTE — PROGRESS NOTES
Pt changed into street clothes independently. Discharge instructions reviewed with pt who voices understanding.     1044 Pt ambulated with steady, gait with all belongings, for discharge home by self.

## 2025-03-31 NOTE — DISCHARGE INSTRUCTIONS
Discharge Instructions for Hysterosalpingogram    You have had a Hysterosalpingogram in the Interventional Radiology department at [facility]. The following are some basic instructions to follow:    Activity: Refrain from sexual intercourse or tampon usage for 2 days. Otherwise, you may return to your normal routine.     Diet: Resume your usual diet and medications.    Medications: Resume home medications unless otherwise instructed by your physician. You may take a mild pain reliever, as needed, Tylenol (acetaminophen) or Advil (ibuprofen) per  dosing recommendations found on bottle, for discomfort.    Side Effects: Expect mild cramping today. You may have bloody spotting or brown, sticky discharge for up to two days.    If you develop any other the following symptoms, contact your physician: Hives, problems breathing, swelling, or signs of infections, including fever over 101 degrees (taken under the tongue), unusual discharge from your vagina, pain in lower abdomen (belly). If you are unable to contact your ordering physician and you feel you have a major problem, please go to the Emergency Room for treatment.

## 2025-03-31 NOTE — PROGRESS NOTES
Pt ambulated with steady gait to CT holding and changed inessa gown independently. Pt's allergy list, medical history, and home medication list reviewed. Pt's VSS. Pt denies pain.     0949 Pt ambulated with steady gait to bathroom to void and then back to CT holding.     1001 Pt taken via wheelchair to fluoro-room 1 for procedure.

## 2025-04-03 NOTE — TELEPHONE ENCOUNTER
Partner informed and patient would like to know if she needs another appointment with you or can you refer her to fertility specialist? Please inform.

## 2025-05-22 ENCOUNTER — TELEPHONE (OUTPATIENT)
Dept: OBGYN CLINIC | Age: 35
End: 2025-05-22

## 2025-05-22 RX ORDER — FLUCONAZOLE 150 MG/1
TABLET ORAL
Qty: 3 TABLET | Refills: 0 | Status: SHIPPED | OUTPATIENT
Start: 2025-05-22

## 2025-06-12 ENCOUNTER — OFFICE VISIT (OUTPATIENT)
Dept: OBGYN CLINIC | Age: 35
End: 2025-06-12
Payer: COMMERCIAL

## 2025-06-12 VITALS
WEIGHT: 83 LBS | SYSTOLIC BLOOD PRESSURE: 100 MMHG | DIASTOLIC BLOOD PRESSURE: 60 MMHG | BODY MASS INDEX: 16.73 KG/M2 | HEART RATE: 100 BPM | HEIGHT: 59 IN

## 2025-06-12 DIAGNOSIS — Z11.51 SPECIAL SCREENING EXAMINATION FOR HUMAN PAPILLOMAVIRUS (HPV): ICD-10-CM

## 2025-06-12 DIAGNOSIS — N97.9 INFERTILITY, FEMALE: ICD-10-CM

## 2025-06-12 DIAGNOSIS — Z01.419 WOMEN'S ANNUAL ROUTINE GYNECOLOGICAL EXAMINATION: Primary | ICD-10-CM

## 2025-06-12 PROCEDURE — 99395 PREV VISIT EST AGE 18-39: CPT | Performed by: OBSTETRICS & GYNECOLOGY

## 2025-06-12 SDOH — ECONOMIC STABILITY: FOOD INSECURITY: WITHIN THE PAST 12 MONTHS, YOU WORRIED THAT YOUR FOOD WOULD RUN OUT BEFORE YOU GOT MONEY TO BUY MORE.: NEVER TRUE

## 2025-06-12 SDOH — ECONOMIC STABILITY: FOOD INSECURITY: WITHIN THE PAST 12 MONTHS, THE FOOD YOU BOUGHT JUST DIDN'T LAST AND YOU DIDN'T HAVE MONEY TO GET MORE.: NEVER TRUE

## 2025-06-12 ASSESSMENT — ENCOUNTER SYMPTOMS
VOMITING: 0
ABDOMINAL PAIN: 0
BACK PAIN: 0
ABDOMINAL DISTENTION: 0
WHEEZING: 0
VOICE CHANGE: 0
COUGH: 0
CHEST TIGHTNESS: 0
TROUBLE SWALLOWING: 0
SHORTNESS OF BREATH: 0
BLOOD IN STOOL: 0
COLOR CHANGE: 0
CONSTIPATION: 0
SORE THROAT: 0
NAUSEA: 0

## 2025-06-12 ASSESSMENT — PATIENT HEALTH QUESTIONNAIRE - PHQ9
SUM OF ALL RESPONSES TO PHQ QUESTIONS 1-9: 0
2. FEELING DOWN, DEPRESSED OR HOPELESS: NOT AT ALL
SUM OF ALL RESPONSES TO PHQ QUESTIONS 1-9: 0
1. LITTLE INTEREST OR PLEASURE IN DOING THINGS: NOT AT ALL
SUM OF ALL RESPONSES TO PHQ QUESTIONS 1-9: 0
SUM OF ALL RESPONSES TO PHQ QUESTIONS 1-9: 0

## 2025-06-12 NOTE — PROGRESS NOTES
CHIEF COMPLAINT:    Chief Complaint   Patient presents with    Annual Exam     No concerns at this time.    Patient is here for annual exam.  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  We were interested in having her  be evaluated but is she states that they are unable to get an appointment in the entire year.  Her cycles are regular.  She states her menstrual flow is a little bit lighter.  She has mild discomfort with intercourse  She had a hysterosalpingogram which was normal.  HISTORY OF PRESENT ILLNESS:  35 y.o. female presents for her annual exam. She had no concernsor complaints today. Her menses is regular.  She denies breakthrough bleeding, pelvic pain, or abnormal discharge.Bowel and bladder function is normal. Her health overallhas been good.     No past medical history on file.  Past Surgical History:   Procedure Laterality Date    UPPER GASTROINTESTINAL ENDOSCOPY N/A 12/26/2017    EGD ESOPHAGOGASTRODUODENOSCOPY performed by Xavier Vazquez MD at [facility] OR    XR CATH & INJ HYSTEROSALPINGOGRAM Bilateral 03/31/2025    by Dr. Fletcher/Sadia Romero CNP     Family History   Problem Relation Age of Onset    Other Mother         brain tumor    Other Father         stomach ulcer     Social History     Socioeconomic History    Marital status: Single     Spouse name: Not on file    Number of children: Not on file    Years of education: Not on file    Highest education level: Not on file   Occupational History    Not on file   Tobacco Use    Smoking status: Never    Smokeless tobacco: Never   Substance and Sexual Activity    Alcohol use: No    Drug use: No    Sexual activity: Not on file   Other Topics Concern    Not on file   Social History Narrative    Not on file     Social Drivers of Health     Financial Resource Strain: Low Risk  (10/29/2024)    Overall Financial Resource Strain (CARDIA)     Difficulty of

## (undated) DEVICE — REAGENT TEST UREASE RAPD CLOTEST F/

## (undated) DEVICE — BRUSH CLEANING ENDOSCOPE CHAN DISP

## (undated) DEVICE — FORCEPS ENDOSCP BX OVL CUP SERR W/NEEDLE 2.3MM DIA 160CML

## (undated) DEVICE — 2000CC GUARDIAN II: Brand: GUARDIAN

## (undated) DEVICE — CONMED SCOPE SAVER BITE BLOCK, 20X27 MM: Brand: SCOPE SAVER

## (undated) DEVICE — ENDO CARRY-ON PROCEDURE KIT: Brand: ENDO CARRY-ON PROCEDURE KIT

## (undated) DEVICE — Device: Brand: ENDO SMARTCAP

## (undated) DEVICE — SONY PRINTER PAPER

## (undated) DEVICE — TUBE SET 96 MM 64 MM H2O PERISTALTIC STD AUX CHANNEL

## (undated) DEVICE — ADAPTER FLSH PMP FLD MGMT GI IRRIG OFP 2 DISPOSABLE

## (undated) DEVICE — NEEDLE ENDO AD L230CM OD0.5MM 0DEG NONBITING BVL GI DISP

## (undated) DEVICE — LUBRICANT SURG JELLY ST BACTER TUBE 4.25OZ

## (undated) DEVICE — GLOVE SURG SZ 85 STD WHT LTX SYN POLYMER BEAD REINF ANTI RL